# Patient Record
Sex: FEMALE | Race: WHITE | Employment: PART TIME | URBAN - METROPOLITAN AREA
[De-identification: names, ages, dates, MRNs, and addresses within clinical notes are randomized per-mention and may not be internally consistent; named-entity substitution may affect disease eponyms.]

---

## 2017-03-13 ENCOUNTER — OFFICE VISIT (OUTPATIENT)
Dept: INTERNAL MEDICINE CLINIC | Facility: CLINIC | Age: 47
End: 2017-03-13

## 2017-03-13 VITALS
SYSTOLIC BLOOD PRESSURE: 116 MMHG | HEART RATE: 97 BPM | OXYGEN SATURATION: 99 % | DIASTOLIC BLOOD PRESSURE: 80 MMHG | BODY MASS INDEX: 19.21 KG/M2 | TEMPERATURE: 98 F | HEIGHT: 67.32 IN | WEIGHT: 123.81 LBS

## 2017-03-13 DIAGNOSIS — R10.32 ABDOMINAL PAIN, LEFT LOWER QUADRANT: ICD-10-CM

## 2017-03-13 DIAGNOSIS — Z00.00 PHYSICAL EXAM: Primary | ICD-10-CM

## 2017-03-13 LAB
BILIRUBIN URINE: NEGATIVE
BLOOD URINE: NEGATIVE
CONTROL RUN WITHIN 24 HOURS?: YES
GLUCOSE URINE: NEGATIVE
KETONE URINE: NEGATIVE
NITRITE URINE: NEGATIVE
PH URINE: 5 (ref 5–8)
PROTEIN URINE: NEGATIVE
SPEC GRAVITY: 1.03 (ref 1–1.03)
URINE CLARITY: CLEAR
URINE COLOR: YELLOW
UROBILINOGEN URINE: 0.2

## 2017-03-13 PROCEDURE — 99396 PREV VISIT EST AGE 40-64: CPT | Performed by: FAMILY MEDICINE

## 2017-03-13 NOTE — PATIENT INSTRUCTIONS
Monitor triggers of abdominal pain    Get Ultrasound if not improving    Make appt for fasting blood draw- fast 8 hours

## 2017-03-13 NOTE — PROGRESS NOTES
HPI:   Gui Tatum is a 40year old female who presents for a complete physical exam.   Last pap:  7/14, pap and HPV neg  Has appt with Dr Shikha Singh next month for annual gyn  Last mammogram:  UTD    Menses:   In nov had very heavy flow, same in dec ( and lo Yes           3.0 oz/week       5 Standard drinks or equivalent per week       Comment: rarely         REVIEW OF SYSTEMS:   GENERAL: feels well otherwise  SKIN: denies any unusual skin lesions  EYES:no vision problems  LUNGS: denies shortness of breath  CA worse   - POCT Urinalysis dipstick  - US PELVIS (TRANSABDOMINAL AND TRANSVAGINAL) (CPT=76856/80055); Future      No orders of the defined types were placed in this encounter.        Meds & Refills for this Visit:  No prescriptions requested or ordered in th

## 2017-03-17 ENCOUNTER — LAB ENCOUNTER (OUTPATIENT)
Dept: LAB | Age: 47
End: 2017-03-17
Attending: FAMILY MEDICINE
Payer: COMMERCIAL

## 2017-03-17 DIAGNOSIS — Z00.00 PHYSICAL EXAM: ICD-10-CM

## 2017-03-17 LAB
ALBUMIN SERPL BCP-MCNC: 3.9 G/DL (ref 3.5–4.8)
ALBUMIN/GLOB SERPL: 1.3 {RATIO} (ref 1–2)
ALP SERPL-CCNC: 38 U/L (ref 32–100)
ALT SERPL-CCNC: 17 U/L (ref 14–54)
ANION GAP SERPL CALC-SCNC: 7 MMOL/L (ref 0–18)
AST SERPL-CCNC: 15 U/L (ref 15–41)
BASOPHILS # BLD: 0.1 K/UL (ref 0–0.2)
BASOPHILS NFR BLD: 3 %
BILIRUB SERPL-MCNC: 1.2 MG/DL (ref 0.3–1.2)
BUN SERPL-MCNC: 12 MG/DL (ref 8–20)
BUN/CREAT SERPL: 15 (ref 10–20)
CALCIUM SERPL-MCNC: 8.9 MG/DL (ref 8.5–10.5)
CHLORIDE SERPL-SCNC: 105 MMOL/L (ref 95–110)
CHOLEST SERPL-MCNC: 163 MG/DL (ref 110–200)
CO2 SERPL-SCNC: 26 MMOL/L (ref 22–32)
CREAT SERPL-MCNC: 0.8 MG/DL (ref 0.5–1.5)
EOSINOPHIL # BLD: 0.1 K/UL (ref 0–0.7)
EOSINOPHIL NFR BLD: 2 %
ERYTHROCYTE [DISTWIDTH] IN BLOOD BY AUTOMATED COUNT: 12.1 % (ref 11–15)
GLOBULIN PLAS-MCNC: 3.1 G/DL (ref 2.5–3.7)
GLUCOSE SERPL-MCNC: 86 MG/DL (ref 70–99)
HCT VFR BLD AUTO: 42 % (ref 35–48)
HDLC SERPL-MCNC: 66 MG/DL
HGB BLD-MCNC: 14.3 G/DL (ref 12–16)
LDLC SERPL CALC-MCNC: 93 MG/DL (ref 0–99)
LYMPHOCYTES # BLD: 1.4 K/UL (ref 1–4)
LYMPHOCYTES NFR BLD: 27 %
MCH RBC QN AUTO: 30.1 PG (ref 27–32)
MCHC RBC AUTO-ENTMCNC: 34.1 G/DL (ref 32–37)
MCV RBC AUTO: 88.5 FL (ref 80–100)
MONOCYTES # BLD: 0.5 K/UL (ref 0–1)
MONOCYTES NFR BLD: 10 %
NEUTROPHILS # BLD AUTO: 3.2 K/UL (ref 1.8–7.7)
NEUTROPHILS NFR BLD: 59 %
NONHDLC SERPL-MCNC: 97 MG/DL
OSMOLALITY UR CALC.SUM OF ELEC: 285 MOSM/KG (ref 275–295)
PLATELET # BLD AUTO: 168 K/UL (ref 140–400)
PMV BLD AUTO: 10.4 FL (ref 7.4–10.3)
POTASSIUM SERPL-SCNC: 4.5 MMOL/L (ref 3.3–5.1)
PROT SERPL-MCNC: 7 G/DL (ref 5.9–8.4)
RBC # BLD AUTO: 4.75 M/UL (ref 3.7–5.4)
SODIUM SERPL-SCNC: 138 MMOL/L (ref 136–144)
TRIGL SERPL-MCNC: 22 MG/DL (ref 1–149)
WBC # BLD AUTO: 5.3 K/UL (ref 4–11)

## 2017-03-17 PROCEDURE — 85025 COMPLETE CBC W/AUTO DIFF WBC: CPT

## 2017-03-17 PROCEDURE — 36415 COLL VENOUS BLD VENIPUNCTURE: CPT

## 2017-03-17 PROCEDURE — 80053 COMPREHEN METABOLIC PANEL: CPT

## 2017-03-17 PROCEDURE — 80061 LIPID PANEL: CPT

## 2017-03-28 ENCOUNTER — HOSPITAL ENCOUNTER (OUTPATIENT)
Dept: ULTRASOUND IMAGING | Age: 47
Discharge: HOME | End: 2017-03-28
Attending: FAMILY MEDICINE
Payer: COMMERCIAL

## 2017-03-28 DIAGNOSIS — R10.32 ABDOMINAL PAIN, LEFT LOWER QUADRANT: ICD-10-CM

## 2017-03-28 PROCEDURE — 76856 US EXAM PELVIC COMPLETE: CPT

## 2017-03-28 PROCEDURE — 76830 TRANSVAGINAL US NON-OB: CPT

## 2017-03-29 ENCOUNTER — TELEPHONE (OUTPATIENT)
Dept: OBGYN CLINIC | Facility: CLINIC | Age: 47
End: 2017-03-29

## 2017-03-30 ENCOUNTER — OFFICE VISIT (OUTPATIENT)
Dept: OBGYN CLINIC | Facility: CLINIC | Age: 47
End: 2017-03-30

## 2017-03-30 VITALS
HEART RATE: 109 BPM | BODY MASS INDEX: 19 KG/M2 | SYSTOLIC BLOOD PRESSURE: 113 MMHG | DIASTOLIC BLOOD PRESSURE: 70 MMHG | WEIGHT: 125 LBS

## 2017-03-30 DIAGNOSIS — N83.202 CYST OF LEFT OVARY: Primary | ICD-10-CM

## 2017-03-30 PROCEDURE — 99212 OFFICE O/P EST SF 10 MIN: CPT | Performed by: OBSTETRICS & GYNECOLOGY

## 2017-03-31 NOTE — PROGRESS NOTES
Abhinav Rob is a 40year old female [id number] Patient's last menstrual period was 03/20/2017 (exact date). Patient presents with:  Consult: abormal U/S    Here with . Last seen 6/30/16. Since Dec, having left pelvic pain, intermittently.

## 2017-04-07 ENCOUNTER — OFFICE VISIT (OUTPATIENT)
Dept: OBGYN CLINIC | Facility: CLINIC | Age: 47
End: 2017-04-07

## 2017-04-07 VITALS
SYSTOLIC BLOOD PRESSURE: 109 MMHG | DIASTOLIC BLOOD PRESSURE: 72 MMHG | HEIGHT: 67.25 IN | WEIGHT: 123 LBS | BODY MASS INDEX: 19.08 KG/M2 | HEART RATE: 65 BPM

## 2017-04-07 DIAGNOSIS — Z01.419 ENCOUNTER FOR GYNECOLOGICAL EXAMINATION: Primary | ICD-10-CM

## 2017-04-07 DIAGNOSIS — Z12.4 SCREENING FOR MALIGNANT NEOPLASM OF CERVIX: ICD-10-CM

## 2017-04-07 PROCEDURE — 99396 PREV VISIT EST AGE 40-64: CPT | Performed by: OBSTETRICS & GYNECOLOGY

## 2017-04-07 NOTE — PROGRESS NOTES
Yesica Burks is a 40year old female [id number] Patient's last menstrual period was 03/29/2017. here for annual exam.       Last seen 3/30/17 for abnormal u/s. Will have it done with next cycle. Started another period 3/29.     Last pap 2/2014 normal wit numbness. Psychiatric: denies depression or anxiety. Endocrine:   denies excessive thirst or urination. Heme/Lymph:  easy bruising or bleeding.     PHYSICAL EXAM:   /72 mmHg  Pulse 65  Ht 5' 7.25\" (1.708 m)  Wt 123 lb (55.792 kg)  BMI 19.12 kg/m2

## 2017-05-08 ENCOUNTER — HOSPITAL ENCOUNTER (OUTPATIENT)
Dept: MAMMOGRAPHY | Facility: HOSPITAL | Age: 47
Discharge: HOME | End: 2017-05-08
Attending: FAMILY MEDICINE
Payer: COMMERCIAL

## 2017-05-08 DIAGNOSIS — Z00.00 PHYSICAL EXAM: ICD-10-CM

## 2017-05-08 PROCEDURE — 77067 SCR MAMMO BI INCL CAD: CPT | Performed by: FAMILY MEDICINE

## 2017-05-09 ENCOUNTER — HOSPITAL ENCOUNTER (OUTPATIENT)
Dept: ULTRASOUND IMAGING | Age: 47
Discharge: HOME | End: 2017-05-09
Attending: OBSTETRICS & GYNECOLOGY
Payer: COMMERCIAL

## 2017-05-09 DIAGNOSIS — N83.202 CYST OF LEFT OVARY: ICD-10-CM

## 2017-05-09 PROCEDURE — 76830 TRANSVAGINAL US NON-OB: CPT | Performed by: OBSTETRICS & GYNECOLOGY

## 2017-05-09 PROCEDURE — 76856 US EXAM PELVIC COMPLETE: CPT | Performed by: OBSTETRICS & GYNECOLOGY

## 2017-05-15 ENCOUNTER — TELEPHONE (OUTPATIENT)
Dept: OBGYN CLINIC | Facility: CLINIC | Age: 47
End: 2017-05-15

## 2017-05-15 NOTE — TELEPHONE ENCOUNTER
Pt saw Woody Armenta 81Nicole for annual on 4/7/17 and pelvic US was ordered to f/u with ovarian cyst. Pt had pelvic US done on 5/9/17. Message to Woody Story to please review.

## 2017-05-15 NOTE — TELEPHONE ENCOUNTER
Pelvic u/s-- 3.5 cm complex left ovarian cyst resolved.   Now with simple 2.5 cm left ovarian cyst.  No further testing needed unless symptomatic

## 2018-05-29 ENCOUNTER — TELEPHONE (OUTPATIENT)
Dept: INTERNAL MEDICINE CLINIC | Facility: CLINIC | Age: 48
End: 2018-05-29

## 2018-05-29 NOTE — TELEPHONE ENCOUNTER
Received Medical records release form from ALEXI Mondragon [address] [place] [address] will send to [facility].

## 2019-03-19 NOTE — TELEPHONE ENCOUNTER
If pt is in pain and is an established pt, absolutely OK for RN slot. Thank you.
PT HAD A ABNORMAL U/S PCP VALERIE, ESTABLISH PT OF DR Shamika Galvin, CAN I TAKE A RN SLOT ?
yari't 3-30-17
sudden onset

## (undated) NOTE — MR AVS SNAPSHOT
[address] at [address] [place] [facility] [id number] [phone]               Thank you for choosing us for your health care visit with Edi Maddox MD.  We are glad to serve you and happy to provide you with [facility]  Diagnostics Main [facility] Parking) (Yellow Parking)  [address]   [address] [facility],  Dina Berry, [address] [facility]  [address]  Main Medical Issues Discussed Today     Physical exam    -  Primary    Abdominal pain, left lower quadrant          Instructions and Information about Your Health    Monitor triggers of abdominal pain    Get Ultrasound if not improving    Make appt for fasting

## (undated) NOTE — MR AVS SNAPSHOT
[place]  [address]  [phone]               Thank you for choosing us for your health care visit with Stevan Freed MD.  We are glad to serve you and happy to provide you with this summary of BP Pulse Height Weight BMI    109/72 mmHg 65 5' 7.25\" (1.708 m) 123 lb (55.792 kg) 19.12 kg/m2         Current Medications      Notice  As of 4/7/2017  5:25 PM    You have not been prescribed any medications.             Results of Recent Testing       My

## (undated) NOTE — MR AVS SNAPSHOT
After Visit Summary   4/7/2017    Alfredo Castanon    MRN: [id number]           Visit Information        Provider Department Dept Phone    4/7/2017  2:20 PM Evan Alba MD [facility] [phone]      Your Vitals Were     BP Pulse Ht Wt BMI LMP

## (undated) NOTE — MR AVS SNAPSHOT
[place]  [address]  [phone]               Thank you for choosing us for your health care visit with Shirin Whitney MD.  We are glad to serve you and happy to provide you with this summary of Allergies as of Mar 30, 2017     No Known Allergies                Today's Vital Signs     BP Pulse Weight Breastfeeding?           113/70 mmHg 109 125 lb (56.7 kg) No           Current Medications      Notice  As of 3/30/2017  8:31 PM    You have not been